# Patient Record
Sex: MALE | Race: WHITE | URBAN - METROPOLITAN AREA
[De-identification: names, ages, dates, MRNs, and addresses within clinical notes are randomized per-mention and may not be internally consistent; named-entity substitution may affect disease eponyms.]

---

## 2023-12-15 ENCOUNTER — APPOINTMENT (RX ONLY)
Dept: URBAN - METROPOLITAN AREA CLINIC 4 | Facility: CLINIC | Age: 23
Setting detail: DERMATOLOGY
End: 2023-12-15

## 2023-12-15 DIAGNOSIS — L91.0 HYPERTROPHIC SCAR: ICD-10-CM

## 2023-12-15 PROCEDURE — 99202 OFFICE O/P NEW SF 15 MIN: CPT | Mod: 25

## 2023-12-15 PROCEDURE — ? SEPARATE AND IDENTIFIABLE DOCUMENTATION

## 2023-12-15 PROCEDURE — ? INTRALESIONAL KENALOG

## 2023-12-15 PROCEDURE — ? COUNSELING

## 2023-12-15 PROCEDURE — 11900 INJECT SKIN LESIONS </W 7: CPT

## 2023-12-15 ASSESSMENT — LOCATION ZONE DERM: LOCATION ZONE: EAR

## 2023-12-15 ASSESSMENT — LOCATION SIMPLE DESCRIPTION DERM: LOCATION SIMPLE: RIGHT EAR

## 2023-12-15 ASSESSMENT — LOCATION DETAILED DESCRIPTION DERM: LOCATION DETAILED: RIGHT ANTERIOR EARLOBE

## 2023-12-15 NOTE — PROCEDURE: INTRALESIONAL KENALOG
Lot # For Kenalog (Optional): [id number]
How Many Mls Were Removed From The 10 Mg/Ml (5ml) Vial When Preparing The Injectable Solution?: 0
Kenalog Preparation: Kenalog
Medical Necessity Clause: This procedure was medically necessary because the lesions that were treated were:
Require Ndc Code?: No
Kenalog Type Of Vial: Multiple Dose
Ndc# For Kenalog Only: 1128-9989-37
Concentration Of Kenalog Solution Injected (Mg/Ml): 10.0
Detail Level: Detailed
Validate Note Data When Using Inventory: Yes
Expiration Date For Kenalog (Optional): OCT 2025
Consent: The risks of atrophy were reviewed with the patient.
Total Volume (Ccs): 0.06